# Patient Record
Sex: MALE | Race: WHITE | NOT HISPANIC OR LATINO
[De-identification: names, ages, dates, MRNs, and addresses within clinical notes are randomized per-mention and may not be internally consistent; named-entity substitution may affect disease eponyms.]

---

## 2024-09-24 ENCOUNTER — APPOINTMENT (OUTPATIENT)
Dept: ORTHOPEDIC SURGERY | Facility: CLINIC | Age: 59
End: 2024-09-24
Payer: COMMERCIAL

## 2024-09-24 ENCOUNTER — APPOINTMENT (OUTPATIENT)
Dept: ORTHOPEDIC SURGERY | Facility: CLINIC | Age: 59
End: 2024-09-24

## 2024-09-24 DIAGNOSIS — R22.41 LOCALIZED SWELLING, MASS AND LUMP, RIGHT LOWER LIMB: ICD-10-CM

## 2024-09-24 PROBLEM — Z00.00 ENCOUNTER FOR PREVENTIVE HEALTH EXAMINATION: Status: ACTIVE | Noted: 2024-09-24

## 2024-09-24 PROCEDURE — 99204 OFFICE O/P NEW MOD 45 MIN: CPT

## 2024-09-24 RX ORDER — METHYLPREDNISOLONE 32 MG/1
32 TABLET ORAL AS DIRECTED
Qty: 2 | Refills: 0 | Status: ACTIVE | COMMUNITY
Start: 2024-09-24 | End: 1900-01-01

## 2024-09-24 NOTE — DISCUSSION/SUMMARY
[All Questions Answered] : Patient (and family) had all questions answered to an agreeable level of satisfaction [de-identified] : Patient has indeterminant soft tissue mass.  It does look like it is mostly fat but there may be some enhancement.  This could also be an ALT.  In any event it is quite small however I would like to investigated further with a dedicated MRI of the area.  This was peripherally seen in the prostate MRI.  I have ordered an MRI with contrast of the right hip specifically looking at the iliopsoas.  Will see if this is fluid possibly coming from the joint versus a fatty mass.  He will follow-up with me after MRI and we can discuss whether surgery is appropriate or not.  If imaging or pathology/biopsy was ordered, the patient was told to make an appointment to review findings right after all imaging is completed.  We discussed risks, benefits and alternatives. Rationale of care was reviewed and all questions were answered. Patient (and family) had all questions answered to her degree of the level of satisfaction. Patient (and family) expressed understanding and interest in proceeding with the plan as outlined.     This note was done with a voice recognition transcription software and any typos are related to this rather than medical error. Surgical risks reviewed. Patient (and family) had all questions answered to an agreeable level of satisfaction. Patient (and family) expressed understanding and interest in proceeding with the plan as outlined.

## 2024-09-24 NOTE — DATA REVIEWED
[Imaging Present] : Present [de-identified] : MRI of the prostate from 6/20/2024 shows: 1-1 and half centimeter lesion in the left posteromedial posterolateral peripheral zone of the prostate there is also a 2 x 2 cm indeterminate mild mixed solid and cystic mass in the right iliopsoas muscle anterior to the right hip.   There was a CT scan of his pelvis from a stone hunt in 2010 which did not show the lesion.

## 2024-09-24 NOTE — REVIEW OF SYSTEMS
[Feeling Tired] : not feeling tired [Pelvic Pain] : no pelvic pain [Dysuria] : no dysuria [Arthralgias] : no arthralgias [Joint Pain] : no joint pain

## 2024-09-24 NOTE — PHYSICAL EXAM
[FreeTextEntry1] : On exam the patient stands in good balance he is able to move around appropriately he has no pain in the hip.  He has a small palpable mass just anterior to the groin at the inguinal ligament on the right compared to the left.  It is not tender.  There is no Tinel's thrills or bruits.  He has full active range of motion of the hip compared to the other side.  He has no lymphadenopathy.  He is neurovascularly intact. [General Appearance - Well-Appearing] : Well appearing [General Appearance - Well Nourished] : well nourished [Oriented To Time, Place, And Person] : Oriented to person, place, and time [Sclera] : the sclera and conjunctiva were normal [Neck Cervical Mass (___cm)] : no neck mass was observed [Heart Rate And Rhythm] : heart rate was normal and rhythm regular [] : No respiratory distress [Abdomen Soft] : Soft [Normal Station and Gait] : gait and station were normal [Masses] : masses [Skin Changes - Describe changes:] : No skin changes noted [Full ROM Unless otherwise noted:] : Full range of motion unless otherwise noted: [LE  Motor Strength Normal unless otherwise noted:] : 5/5 strength in bilateral lower extemities unless otherwise noted. [Normal] : Sensation intact to light touch.

## 2024-09-24 NOTE — HISTORY OF PRESENT ILLNESS
[FreeTextEntry1] : This is a 59-year-old gentleman who comes in after having imaging of his pelvis finding a mass around the right hip he was sent to me for evaluation of this mass.  He was worked up for prostate because of a PSA of 9 he had a biopsy which was told was negative however on the MRI scan of his pelvis and prostate he was found to have a lesion in his right iliopsoas he was then sent to me for further evaluation.  He has no pain in the area.  He does not remember any trauma or other incident. [Stable] : stable [0] : currently ~his/her~ pain is 0 out of 10

## 2024-09-24 NOTE — CONSULT LETTER
[Dear  ___] : Dear  [unfilled], [FreeTextEntry2] : [address] [place], [place] [id number] [FreeTextEntry1] :  After evaluating your patient and reviewing the studies presented we have come to a mutually agreeable plan.   Please see my note below.  Should you have further questions, please feel free to call and discuss the care of your patient.  Thank you for the confidence of your referral.  Sincerely,   Kirt Felder MD  Chief, Musculoskeletal Oncology   [phone]

## 2024-10-29 ENCOUNTER — APPOINTMENT (OUTPATIENT)
Dept: ORTHOPEDIC SURGERY | Facility: CLINIC | Age: 59
End: 2024-10-29
Payer: COMMERCIAL

## 2024-10-29 PROCEDURE — 99214 OFFICE O/P EST MOD 30 MIN: CPT

## 2025-02-15 DIAGNOSIS — R22.41 LOCALIZED SWELLING, MASS AND LUMP, RIGHT LOWER LIMB: ICD-10-CM

## 2025-06-24 ENCOUNTER — APPOINTMENT (OUTPATIENT)
Dept: ORTHOPEDIC SURGERY | Facility: CLINIC | Age: 60
End: 2025-06-24
Payer: COMMERCIAL

## 2025-06-24 PROCEDURE — 99214 OFFICE O/P EST MOD 30 MIN: CPT | Mod: 25

## 2025-06-24 PROCEDURE — 10160 PNXR ASPIR ABSC HMTMA BULLA: CPT

## 2025-07-01 LAB — OTHER FLUID CYTOLOGY: NORMAL

## 2025-07-07 ENCOUNTER — NON-APPOINTMENT (OUTPATIENT)
Age: 60
End: 2025-07-07

## 2025-07-14 ENCOUNTER — APPOINTMENT (OUTPATIENT)
Dept: ORTHOPEDIC SURGERY | Facility: CLINIC | Age: 60
End: 2025-07-14
Payer: COMMERCIAL

## 2025-07-14 ENCOUNTER — NON-APPOINTMENT (OUTPATIENT)
Age: 60
End: 2025-07-14

## 2025-07-14 PROCEDURE — 99214 OFFICE O/P EST MOD 30 MIN: CPT | Mod: 95
